# Patient Record
Sex: FEMALE | Race: AMERICAN INDIAN OR ALASKA NATIVE
[De-identification: names, ages, dates, MRNs, and addresses within clinical notes are randomized per-mention and may not be internally consistent; named-entity substitution may affect disease eponyms.]

---

## 2017-04-07 NOTE — MAMMOGRAPHY REPORT
BILATERAL MAMMOGRAM:



FINDINGS:

The breast tissue is heterogeneously dense, which could obscure

detection of small masses (approximately 50%-75% glandular).  No mass, 

distortion, suspicious calcification, or skin change is seen. There are 

no significant changes identified when compared to her prior study in 

January 2015 and March 2016.



CAD was utilized.



IMPRESSION:

Negative mammogram.  There is no mammographic evidence of

malignancy.



RECOMMENDATION:

Follow-up per ACS guidelines.



BI-RADS CATEGORY:  1 = Negative



ACR BI-RADS MAMMOGRAPHIC CODES:

0 = Needs additional imaging evaluation; 1 = Negative; 2 = Benign; 3 = 

Probably benign; 4 = Suspicious; 5 = Malignant; 6 = Known biopsy-proven 

malignancy



COMMENT:

      1.   Dense breast tissue, i.e., adenosis, fibrocystic 

            changes, etc., may obscure an underlying neoplasm.

      2.   Approximately 10% of cancers are not detected with

            mammography.

      3.   A negative mammography report should not delay biopsy 

            if a clinically suspicious mass is present.



COMMENT:

Patient follow-up letters are generated in [employer].

## 2018-06-08 ENCOUNTER — HOSPITAL ENCOUNTER (OUTPATIENT)
Dept: HOSPITAL 5 - MAMMO | Age: 73
Discharge: HOME | End: 2018-06-08
Attending: OBSTETRICS & GYNECOLOGY
Payer: MEDICARE

## 2018-06-08 DIAGNOSIS — Z12.31: Primary | ICD-10-CM

## 2018-06-08 PROCEDURE — 77063 BREAST TOMOSYNTHESIS BI: CPT

## 2018-06-08 PROCEDURE — 77067 SCR MAMMO BI INCL CAD: CPT

## 2018-06-09 NOTE — MAMMOGRAPHY REPORT
BILATERAL DIGITAL SCREENING MAMMOGRAM with CAD and  DIGITAL BREAST 

TOMOSYNTHESIS (DBT) : 06/08/18 



CLINICAL: Routine screening.



COMPARISON:04/07/17, 03/25/16 and 01/26/15



FINDINGS: The breasts are heterogeneously dense, which may obscure 

small masses.Bilateral benign calcifications. No mass, architectural 

distortion or suspicious calcifications.



IMPRESSION: No mammographic evidence of malignancy.



BI-RADS CATEGORY: 2 - - Benign



RECOMMENDATION: Routine mammographic screening in one year.





COMMENT:

Patient follow-up letters are generated by our [employer] application.

## 2018-06-09 NOTE — MAMMOGRAPHY REPORT
BILATERAL DIGITAL SCREENING MAMMOGRAM with CAD and  DIGITAL BREAST 

TOMOSYNTHESIS (DBT) : 06/08/18 



CLINICAL: Routine screening.



COMPARISON:04/07/17, 03/25/16 and 01/26/15



FINDINGS: The breasts are heterogeneously dense, which may obscure 

small masses.Bilateral benign calcifications. No mass, architectural 

distortion or suspicious calcifications.



IMPRESSION: No mammographic evidence of malignancy.



BI-RADS CATEGORY: 2 - - Benign



RECOMMENDATION: Routine mammographic screening in one year.





COMMENT:

Patient follow-up letters are generated by our [employer] application.

## 2020-08-06 ENCOUNTER — OFFICE VISIT (OUTPATIENT)
Dept: URBAN - METROPOLITAN AREA TELEHEALTH 2 | Facility: TELEHEALTH | Age: 75
End: 2020-08-06
Payer: MEDICARE

## 2020-08-06 DIAGNOSIS — K21.9 NONEROSIVE ESOPHAGEAL REFLUX DISEASE: ICD-10-CM

## 2020-08-06 DIAGNOSIS — R10.13 DYSPEPSIA: ICD-10-CM

## 2020-08-06 DIAGNOSIS — Z12.11 SCREEN FOR COLON CANCER: ICD-10-CM

## 2020-08-06 DIAGNOSIS — I10 ESSENTIAL HYPERTENSION: ICD-10-CM

## 2020-08-06 PROCEDURE — 99202 OFFICE O/P NEW SF 15 MIN: CPT | Performed by: INTERNAL MEDICINE

## 2020-08-06 PROCEDURE — 99442 PHONE E/M BY PHYS 11-20 MIN: CPT | Performed by: INTERNAL MEDICINE

## 2020-08-06 PROCEDURE — 99212 OFFICE O/P EST SF 10 MIN: CPT | Performed by: INTERNAL MEDICINE

## 2020-08-06 RX ORDER — OMEPRAZOLE 40 MG/1
TAKE 1 CAPSULE (40 MG) BY ORAL ROUTE ONCE DAILY BEFORE A MEAL FOR 90 DAYS CAPSULE, DELAYED RELEASE PELLETS ORAL 1
Qty: 90 | Refills: 0
Start: 2020-01-20

## 2020-08-06 RX ORDER — TRIAMTERENE/HYDROCHLOROTHIAZID 37.5-25 MG
CAPSULE ORAL
Qty: 0 | Refills: 0 | Status: ACTIVE | COMMUNITY
Start: 1900-01-01

## 2020-08-06 RX ORDER — PHENYLEPHRINE HCL 10 MG
TABLET ORAL
Qty: 0 | Refills: 0 | Status: ACTIVE | COMMUNITY
Start: 1900-01-01

## 2020-08-06 RX ORDER — MAXZIDE 75; 50 MG/1; MG/1
TAKE 1 TABLET BY ORAL ROUTE ONCE DAILY TABLET ORAL 1
Qty: 0 | Refills: 0 | Status: ACTIVE | COMMUNITY
Start: 1900-01-01

## 2020-08-06 NOTE — HPI-OTHER HISTORIES
73 yo lady pt of DR Quan. She has a h/o GERD and last EGD in 2010. She c/o epigastric discomfort for the past 3 weeks. It is intermittent and she takes ranitidine for this. It occurs right after a BM and sometimes when she gets hungry. it resolves about 20 mins after eating. She also has a pre sternal burning sensation with swallowing "sometimes. Its not really burning. I can feel it. It doesnt hurt". No nausea or vomiting or dysphagia. She takes Aleve rarely less than once a week. She had abx last month amoxicillin.  8/20/19 Discussed EGD results. Taking Omeprazole daily - this keeps her symptoms at bay. Symptoms are usually precipitated by pasta, and spicy foods. She eats dinner late, up to 10 pm. she has a very active social life at night - eats a lot of wings and dip. She will then have a shot of tequila and beer at midnight.  2/6/20 Doing well. only takes PPI when she has reflux episodes for a few days and then does not need it for a few weeks. Reflux is usually precipitated by eating or drinking late at night - she is aware and tries to avoid this but has a very active social life. 8/6/20 She is out of Omeprazole and need a refill.  It controls her reflux well when she takes it but she is having take qod now instead of once in a while as  "I have been eating many spicy foods", and is taking omeprazole qod for this reason.   She says she is keeping an eye on her weight and is trying to loose weight. She has been successful in loosing 4 lbs since 10/2019.

## 2021-03-25 ENCOUNTER — WEB ENCOUNTER (OUTPATIENT)
Dept: URBAN - METROPOLITAN AREA CLINIC 17 | Facility: CLINIC | Age: 76
End: 2021-03-25

## 2021-03-25 ENCOUNTER — OFFICE VISIT (OUTPATIENT)
Dept: URBAN - METROPOLITAN AREA CLINIC 17 | Facility: CLINIC | Age: 76
End: 2021-03-25
Payer: MEDICARE

## 2021-03-25 DIAGNOSIS — K21.9 NONEROSIVE ESOPHAGEAL REFLUX DISEASE: ICD-10-CM

## 2021-03-25 DIAGNOSIS — R10.13 DYSPEPSIA: ICD-10-CM

## 2021-03-25 DIAGNOSIS — Z12.11 SCREEN FOR COLON CANCER: ICD-10-CM

## 2021-03-25 DIAGNOSIS — I10 ESSENTIAL HYPERTENSION: ICD-10-CM

## 2021-03-25 PROCEDURE — 99213 OFFICE O/P EST LOW 20 MIN: CPT | Performed by: INTERNAL MEDICINE

## 2021-03-25 RX ORDER — PHENYLEPHRINE HCL 10 MG
TABLET ORAL
Qty: 0 | Refills: 0 | Status: ACTIVE | COMMUNITY
Start: 1900-01-01

## 2021-03-25 RX ORDER — OMEPRAZOLE 40 MG/1
TAKE 1 CAPSULE (40 MG) BY ORAL ROUTE ONCE DAILY BEFORE A MEAL FOR 90 DAYS CAPSULE, DELAYED RELEASE PELLETS ORAL ONCE A DAY
Qty: 90 | Refills: 3

## 2021-03-25 RX ORDER — MAXZIDE 75; 50 MG/1; MG/1
TAKE 1 TABLET BY ORAL ROUTE ONCE DAILY TABLET ORAL 1
Qty: 0 | Refills: 0 | Status: ACTIVE | COMMUNITY
Start: 1900-01-01

## 2021-03-25 RX ORDER — TRIAMTERENE/HYDROCHLOROTHIAZID 37.5-25 MG
CAPSULE ORAL
Qty: 0 | Refills: 0 | Status: ACTIVE | COMMUNITY
Start: 1900-01-01

## 2021-03-25 RX ORDER — OMEPRAZOLE 40 MG/1
TAKE 1 CAPSULE (40 MG) BY ORAL ROUTE ONCE DAILY BEFORE A MEAL FOR 90 DAYS CAPSULE, DELAYED RELEASE PELLETS ORAL 1
Qty: 90 | Refills: 0 | Status: ACTIVE | COMMUNITY
Start: 2020-01-20

## 2021-03-25 NOTE — HPI-OTHER HISTORIES
73 yo lady pt of DR Quan. She has a h/o GERD and last EGD in 2010. She c/o epigastric discomfort for the past 3 weeks. It is intermittent and she takes ranitidine for this. It occurs right after a BM and sometimes when she gets hungry. it resolves about 20 mins after eating. She also has a pre sternal burning sensation with swallowing "sometimes. Its not really burning. I can feel it. It doesnt hurt". No nausea or vomiting or dysphagia. She takes Aleve rarely less than once a week. She had abx last month amoxicillin.  8/20/19 Discussed EGD results. Taking Omeprazole daily - this keeps her symptoms at bay. Symptoms are usually precipitated by pasta, and spicy foods. She eats dinner late, up to 10 pm. she has a very active social life at night - eats a lot of wings and dip. She will then have a shot of tequila and beer at midnight.  2/6/20 Doing well. only takes PPI when she has reflux episodes for a few days and then does not need it for a few weeks. Reflux is usually precipitated by eating or drinking late at night - she is aware and tries to avoid this but has a very active social life. 8/6/20 She is out of Omeprazole and need a refill.  It controls her reflux well when she takes it but she is having take qod now instead of once in a while as  "I have been eating many spicy foods", and is taking omeprazole qod for this reason.   She says she is keeping an eye on her weight and is trying to loose weight. She has been successful in loosing 4 lbs since 10/2019.   3/25/21 doing well. Reflux "its been active the last couple of days but thats on me, so when it bothers me I take my pills. Im not doing what I'm supposed to do". Says eating food that are not spicy and processed is "boring". She needs Omeprazole.

## 2022-07-26 ENCOUNTER — OFFICE VISIT (OUTPATIENT)
Dept: URBAN - METROPOLITAN AREA CLINIC 17 | Facility: CLINIC | Age: 77
End: 2022-07-26
Payer: MEDICARE

## 2022-07-26 ENCOUNTER — LAB OUTSIDE AN ENCOUNTER (OUTPATIENT)
Dept: URBAN - METROPOLITAN AREA CLINIC 17 | Facility: CLINIC | Age: 77
End: 2022-07-26

## 2022-07-26 VITALS
WEIGHT: 180 LBS | SYSTOLIC BLOOD PRESSURE: 126 MMHG | HEIGHT: 66 IN | DIASTOLIC BLOOD PRESSURE: 74 MMHG | HEART RATE: 75 BPM | BODY MASS INDEX: 28.93 KG/M2 | TEMPERATURE: 97.3 F

## 2022-07-26 DIAGNOSIS — K21.9 NONEROSIVE ESOPHAGEAL REFLUX DISEASE: ICD-10-CM

## 2022-07-26 DIAGNOSIS — I10 ESSENTIAL HYPERTENSION: ICD-10-CM

## 2022-07-26 DIAGNOSIS — R13.19 ESOPHAGEAL DYSPHAGIA: ICD-10-CM

## 2022-07-26 DIAGNOSIS — Z12.11 SCREEN FOR COLON CANCER: ICD-10-CM

## 2022-07-26 DIAGNOSIS — R10.13 DYSPEPSIA: ICD-10-CM

## 2022-07-26 PROCEDURE — 99214 OFFICE O/P EST MOD 30 MIN: CPT | Performed by: INTERNAL MEDICINE

## 2022-07-26 RX ORDER — PHENYLEPHRINE HCL 10 MG
TABLET ORAL
Qty: 0 | Refills: 0 | Status: ACTIVE | COMMUNITY
Start: 1900-01-01

## 2022-07-26 RX ORDER — OMEPRAZOLE 40 MG/1
TAKE 1 CAPSULE (40 MG) BY ORAL ROUTE ONCE DAILY BEFORE A MEAL FOR 90 DAYS CAPSULE, DELAYED RELEASE PELLETS ORAL ONCE A DAY
Qty: 90 | Refills: 3

## 2022-07-26 RX ORDER — TRIAMTERENE/HYDROCHLOROTHIAZID 37.5-25 MG
CAPSULE ORAL
Qty: 0 | Refills: 0 | Status: ACTIVE | COMMUNITY
Start: 1900-01-01

## 2022-07-26 RX ORDER — OMEPRAZOLE 40 MG/1
TAKE 1 CAPSULE (40 MG) BY ORAL ROUTE ONCE DAILY BEFORE A MEAL FOR 90 DAYS CAPSULE, DELAYED RELEASE PELLETS ORAL ONCE A DAY
Qty: 90 | Refills: 3 | Status: ACTIVE | COMMUNITY

## 2022-07-26 RX ORDER — MAXZIDE 75; 50 MG/1; MG/1
TAKE 1 TABLET BY ORAL ROUTE ONCE DAILY TABLET ORAL 1
Qty: 0 | Refills: 0 | Status: ACTIVE | COMMUNITY
Start: 1900-01-01

## 2022-07-26 NOTE — HPI-OTHER HISTORIES
73 yo lady pt of DR Quan. She has a h/o GERD and last EGD in 2010. She c/o epigastric discomfort for the past 3 weeks. It is intermittent and she takes ranitidine for this. It occurs right after a BM and sometimes when she gets hungry. it resolves about 20 mins after eating. She also has a pre sternal burning sensation with swallowing "sometimes. Its not really burning. I can feel it. It doesnt hurt". No nausea or vomiting or dysphagia. She takes Aleve rarely less than once a week. She had abx last month amoxicillin.  8/20/19 Discussed EGD results. Taking Omeprazole daily - this keeps her symptoms at bay. Symptoms are usually precipitated by pasta, and spicy foods. She eats dinner late, up to 10 pm. she has a very active social life at night - eats a lot of wings and dip. She will then have a shot of tequila and beer at midnight.  2/6/20 Doing well. only takes PPI when she has reflux episodes for a few days and then does not need it for a few weeks. Reflux is usually precipitated by eating or drinking late at night - she is aware and tries to avoid this but has a very active social life. 8/6/20 She is out of Omeprazole and need a refill.  It controls her reflux well when she takes it but she is having take qod now instead of once in a while as  "I have been eating many spicy foods", and is taking omeprazole qod for this reason.   She says she is keeping an eye on her weight and is trying to loose weight. She has been successful in loosing 4 lbs since 10/2019.   3/25/21 doing well. Reflux "its been active the last couple of days but thats on me, so when it bothers me I take my pills. Im not doing what I'm supposed to do". Says eating food that are not spicy and processed is "boring". She needs Omeprazole.  7/26/22 Here for fu visit No fhx of colon CA. Reviewed colon report from 2017 - good prep no polyps 2006 no polyps Says when she turned 52 she had 2 small polyps No changes in bowel habits. Has regular BMs.  says when she swallows sometimes "even water, it feels like it gets caught in my esophagus. Rice will do that sometimes and then I will regurgitate it. Everytime I eat bread I will hiccup, does not happen with toast". Says symptoms with swallowing happen only when she drinks water + her pills. And the rice is only chinese Rice - these happen once every few weeks. Reviewed EGD done in 2017 - she had no dysphagia then and esophagus was wnl. SAys no longer eating late at night.  Omeprazole controls her symptoms - "I take it when my stomach acts up and then take it 7 days in a row". Does not take it with any regularity. Does this when she gets epigastric pain. This controls her symptoms for a month or so before she has to take it again.  STill drinking beer and tequila at night with her  "but not every night".

## 2022-10-03 ENCOUNTER — WEB ENCOUNTER (OUTPATIENT)
Dept: URBAN - METROPOLITAN AREA CLINIC 17 | Facility: CLINIC | Age: 77
End: 2022-10-03

## 2022-10-03 ENCOUNTER — OFFICE VISIT (OUTPATIENT)
Dept: URBAN - METROPOLITAN AREA CLINIC 17 | Facility: CLINIC | Age: 77
End: 2022-10-03
Payer: MEDICARE

## 2022-10-03 VITALS
WEIGHT: 186 LBS | HEIGHT: 66 IN | DIASTOLIC BLOOD PRESSURE: 78 MMHG | BODY MASS INDEX: 29.89 KG/M2 | TEMPERATURE: 97.1 F | HEART RATE: 71 BPM | SYSTOLIC BLOOD PRESSURE: 137 MMHG

## 2022-10-03 DIAGNOSIS — I10 ESSENTIAL HYPERTENSION: ICD-10-CM

## 2022-10-03 DIAGNOSIS — Z12.11 SCREEN FOR COLON CANCER: ICD-10-CM

## 2022-10-03 DIAGNOSIS — R10.13 DYSPEPSIA: ICD-10-CM

## 2022-10-03 DIAGNOSIS — R13.19 ESOPHAGEAL DYSPHAGIA: ICD-10-CM

## 2022-10-03 DIAGNOSIS — K21.9 NONEROSIVE ESOPHAGEAL REFLUX DISEASE: ICD-10-CM

## 2022-10-03 PROBLEM — 235595009: Status: ACTIVE | Noted: 2020-08-05

## 2022-10-03 PROBLEM — 162031009: Status: ACTIVE | Noted: 2020-08-05

## 2022-10-03 PROBLEM — 59621000: Status: ACTIVE | Noted: 2020-08-05

## 2022-10-03 PROCEDURE — 99213 OFFICE O/P EST LOW 20 MIN: CPT | Performed by: INTERNAL MEDICINE

## 2022-10-03 RX ORDER — TRIAMTERENE/HYDROCHLOROTHIAZID 37.5-25 MG
CAPSULE ORAL
Qty: 0 | Refills: 0 | Status: DISCONTINUED | COMMUNITY
Start: 1900-01-01

## 2022-10-03 RX ORDER — MAXZIDE 75; 50 MG/1; MG/1
TAKE 1 TABLET BY ORAL ROUTE ONCE DAILY TABLET ORAL 1
Qty: 0 | Refills: 0 | Status: ACTIVE | COMMUNITY
Start: 1900-01-01

## 2022-10-03 RX ORDER — OMEPRAZOLE 40 MG/1
TAKE 1 CAPSULE (40 MG) BY ORAL ROUTE ONCE DAILY BEFORE A MEAL FOR 90 DAYS CAPSULE, DELAYED RELEASE PELLETS ORAL ONCE A DAY
Qty: 90 | Refills: 3 | Status: ACTIVE | COMMUNITY

## 2022-10-03 RX ORDER — PHENYLEPHRINE HCL 10 MG
TABLET ORAL
Qty: 0 | Refills: 0 | Status: ACTIVE | COMMUNITY
Start: 1900-01-01

## 2022-10-03 RX ORDER — OMEPRAZOLE 40 MG/1
TAKE 1 CAPSULE (40 MG) BY ORAL ROUTE ONCE DAILY BEFORE A MEAL FOR 90 DAYS CAPSULE, DELAYED RELEASE PELLETS ORAL ONCE A DAY
Qty: 90 | Refills: 3

## 2022-10-03 NOTE — HPI-OTHER HISTORIES
75 yo lady pt of DR Quan. She has a h/o GERD and last EGD in 2010. She c/o epigastric discomfort for the past 3 weeks. It is intermittent and she takes ranitidine for this. It occurs right after a BM and sometimes when she gets hungry. it resolves about 20 mins after eating. She also has a pre sternal burning sensation with swallowing "sometimes. Its not really burning. I can feel it. It doesnt hurt". No nausea or vomiting or dysphagia. She takes Aleve rarely less than once a week. She had abx last month amoxicillin.  8/20/19 Discussed EGD results. Taking Omeprazole daily - this keeps her symptoms at bay. Symptoms are usually precipitated by pasta, and spicy foods. She eats dinner late, up to 10 pm. she has a very active social life at night - eats a lot of wings and dip. She will then have a shot of tequila and beer at midnight.  2/6/20 Doing well. only takes PPI when she has reflux episodes for a few days and then does not need it for a few weeks. Reflux is usually precipitated by eating or drinking late at night - she is aware and tries to avoid this but has a very active social life. 8/6/20 She is out of Omeprazole and need a refill.  It controls her reflux well when she takes it but she is having take qod now instead of once in a while as  "I have been eating many spicy foods", and is taking omeprazole qod for this reason.   She says she is keeping an eye on her weight and is trying to loose weight. She has been successful in loosing 4 lbs since 10/2019.   3/25/21 doing well. Reflux "its been active the last couple of days but thats on me, so when it bothers me I take my pills. Im not doing what I'm supposed to do". Says eating food that are not spicy and processed is "boring". She needs Omeprazole.  7/26/22 Here for fu visit No fhx of colon CA. Reviewed colon report from 2017 - good prep no polyps 2006 no polyps Says when she turned 52 she had 2 small polyps No changes in bowel habits. Has regular BMs.  says when she swallows sometimes "even water, it feels like it gets caught in my esophagus. Rice will do that sometimes and then I will regurgitate it. Everytime I eat bread I will hiccup, does not happen with toast". Says symptoms with swallowing happen only when she drinks water + her pills. And the rice is only chinese Rice - these happen once every few weeks. Reviewed EGD done in 2017 - she had no dysphagia then and esophagus was wnl. SAys no longer eating late at night.  Omeprazole controls her symptoms - "I take it when my stomach acts up and then take it 7 days in a row". Does not take it with any regularity. Does this when she gets epigastric pain. This controls her symptoms for a month or so before she has to take it again.  STill drinking beer and tequila at night with her  "but not every night".  10/3/22 Here for f.u visit Neg Barium swallow.  sAys no more difficulty swallowing.  She has taken her PPI "if she has some epigastric discomfort) "maybe I eat the wrong thing and it goes away.  Gained a few lbs from taking steroids for her sarcoidosis - pleurisy and some l/nodes.

## 2023-08-03 ENCOUNTER — OFFICE VISIT (OUTPATIENT)
Dept: URBAN - METROPOLITAN AREA CLINIC 17 | Facility: CLINIC | Age: 78
End: 2023-08-03
Payer: MEDICARE

## 2023-08-03 VITALS
HEART RATE: 81 BPM | TEMPERATURE: 97.4 F | HEIGHT: 66 IN | WEIGHT: 187 LBS | BODY MASS INDEX: 30.05 KG/M2 | SYSTOLIC BLOOD PRESSURE: 111 MMHG | DIASTOLIC BLOOD PRESSURE: 74 MMHG

## 2023-08-03 DIAGNOSIS — R13.19 ESOPHAGEAL DYSPHAGIA: ICD-10-CM

## 2023-08-03 DIAGNOSIS — K62.5 RECTAL BLEEDING: ICD-10-CM

## 2023-08-03 DIAGNOSIS — R10.13 DYSPEPSIA: ICD-10-CM

## 2023-08-03 DIAGNOSIS — K21.9 NONEROSIVE ESOPHAGEAL REFLUX DISEASE: ICD-10-CM

## 2023-08-03 DIAGNOSIS — I10 ESSENTIAL HYPERTENSION: ICD-10-CM

## 2023-08-03 DIAGNOSIS — Z12.11 SCREEN FOR COLON CANCER: ICD-10-CM

## 2023-08-03 PROCEDURE — 99213 OFFICE O/P EST LOW 20 MIN: CPT | Performed by: INTERNAL MEDICINE

## 2023-08-03 RX ORDER — HYDROCORTISONE ACETATE 25 MG/1
1 SUPPOSITORY SUPPOSITORY RECTAL
Qty: 28 | Refills: 0 | OUTPATIENT
Start: 2023-08-03 | End: 2023-08-17

## 2023-08-03 RX ORDER — PHENYLEPHRINE HCL 10 MG
TABLET ORAL
Qty: 0 | Refills: 0 | Status: ACTIVE | COMMUNITY
Start: 1900-01-01

## 2023-08-03 RX ORDER — OMEPRAZOLE 40 MG/1
TAKE 1 CAPSULE (40 MG) BY ORAL ROUTE ONCE DAILY BEFORE A MEAL FOR 90 DAYS CAPSULE, DELAYED RELEASE PELLETS ORAL ONCE A DAY
Qty: 90 | Refills: 3 | Status: ACTIVE | COMMUNITY

## 2023-08-03 RX ORDER — MAXZIDE 75; 50 MG/1; MG/1
TAKE 1 TABLET BY ORAL ROUTE ONCE DAILY TABLET ORAL 1
Qty: 0 | Refills: 0 | Status: ACTIVE | COMMUNITY
Start: 1900-01-01

## 2023-08-03 NOTE — HPI-OTHER HISTORIES
73 yo lady pt of DR Quan. She has a h/o GERD and last EGD in 2010. She c/o epigastric discomfort for the past 3 weeks. It is intermittent and she takes ranitidine for this. It occurs right after a BM and sometimes when she gets hungry. it resolves about 20 mins after eating. She also has a pre sternal burning sensation with swallowing "sometimes. Its not really burning. I can feel it. It doesnt hurt". No nausea or vomiting or dysphagia. She takes Aleve rarely less than once a week. She had abx last month amoxicillin.  8/20/19 Discussed EGD results. Taking Omeprazole daily - this keeps her symptoms at bay. Symptoms are usually precipitated by pasta, and spicy foods. She eats dinner late, up to 10 pm. she has a very active social life at night - eats a lot of wings and dip. She will then have a shot of tequila and beer at midnight.  2/6/20 Doing well. only takes PPI when she has reflux episodes for a few days and then does not need it for a few weeks. Reflux is usually precipitated by eating or drinking late at night - she is aware and tries to avoid this but has a very active social life. 8/6/20 She is out of Omeprazole and need a refill.  It controls her reflux well when she takes it but she is having take qod now instead of once in a while as  "I have been eating many spicy foods", and is taking omeprazole qod for this reason.   She says she is keeping an eye on her weight and is trying to loose weight. She has been successful in loosing 4 lbs since 10/2019.   3/25/21 doing well. Reflux "its been active the last couple of days but thats on me, so when it bothers me I take my pills. Im not doing what I'm supposed to do". Says eating food that are not spicy and processed is "boring". She needs Omeprazole.  7/26/22 Here for fu visit No fhx of colon CA. Reviewed colon report from 2017 - good prep no polyps 2006 no polyps Says when she turned 52 she had 2 small polyps No changes in bowel habits. Has regular BMs.  says when she swallows sometimes "even water, it feels like it gets caught in my esophagus. Rice will do that sometimes and then I will regurgitate it. Everytime I eat bread I will hiccup, does not happen with toast". Says symptoms with swallowing happen only when she drinks water + her pills. And the rice is only chinese Rice - these happen once every few weeks. Reviewed EGD done in 2017 - she had no dysphagia then and esophagus was wnl. SAys no longer eating late at night.  Omeprazole controls her symptoms - "I take it when my stomach acts up and then take it 7 days in a row". Does not take it with any regularity. Does this when she gets epigastric pain. This controls her symptoms for a month or so before she has to take it again.  STill drinking beer and tequila at night with her  "but not every night".  10/3/22 Here for f.u visit Neg Barium swallow.  sAys no more difficulty swallowing.  She has taken her PPI "if she has some epigastric discomfort) "maybe I eat the wrong thing and it goes away.  Gained a few lbs from taking steroids for her sarcoidosis - pleurisy and some l/nodes.  8/3/23 Here for f.u visit doing well . no dysphagia. Only taking PPI as needed c/o hemorroidal disease. Will occ see blood on tissue wipe never in stool. no rectal pain/ BMs are daily and every once in a while will strain and thats when hemorrhoid flares. drinks only 24 ounces of water a day

## 2023-08-10 ENCOUNTER — TELEPHONE ENCOUNTER (OUTPATIENT)
Dept: URBAN - METROPOLITAN AREA CLINIC 3 | Facility: CLINIC | Age: 78
End: 2023-08-10

## 2023-09-25 ENCOUNTER — OFFICE VISIT (OUTPATIENT)
Dept: URBAN - METROPOLITAN AREA CLINIC 17 | Facility: CLINIC | Age: 78
End: 2023-09-25

## 2023-09-25 RX ORDER — PHENYLEPHRINE HCL 10 MG
TABLET ORAL
Qty: 0 | Refills: 0 | COMMUNITY
Start: 1900-01-01

## 2023-09-25 RX ORDER — MAXZIDE 75; 50 MG/1; MG/1
TAKE 1 TABLET BY ORAL ROUTE ONCE DAILY TABLET ORAL 1
Qty: 0 | Refills: 0 | COMMUNITY
Start: 1900-01-01

## 2023-09-25 RX ORDER — OMEPRAZOLE 40 MG/1
TAKE 1 CAPSULE (40 MG) BY ORAL ROUTE ONCE DAILY BEFORE A MEAL FOR 90 DAYS CAPSULE, DELAYED RELEASE PELLETS ORAL ONCE A DAY
Qty: 90 | Refills: 3 | COMMUNITY

## 2023-11-27 ENCOUNTER — OFFICE VISIT (OUTPATIENT)
Dept: URBAN - METROPOLITAN AREA CLINIC 17 | Facility: CLINIC | Age: 78
End: 2023-11-27

## 2023-11-27 RX ORDER — PHENYLEPHRINE HCL 10 MG
TABLET ORAL
Qty: 0 | Refills: 0 | COMMUNITY
Start: 1900-01-01

## 2023-11-27 RX ORDER — MAXZIDE 75; 50 MG/1; MG/1
TAKE 1 TABLET BY ORAL ROUTE ONCE DAILY TABLET ORAL 1
Qty: 0 | Refills: 0 | COMMUNITY
Start: 1900-01-01

## 2023-11-27 RX ORDER — OMEPRAZOLE 40 MG/1
TAKE 1 CAPSULE (40 MG) BY ORAL ROUTE ONCE DAILY BEFORE A MEAL FOR 90 DAYS CAPSULE, DELAYED RELEASE PELLETS ORAL ONCE A DAY
Qty: 90 | Refills: 3 | COMMUNITY

## 2024-01-22 ENCOUNTER — LAB OUTSIDE AN ENCOUNTER (OUTPATIENT)
Dept: URBAN - METROPOLITAN AREA CLINIC 17 | Facility: CLINIC | Age: 79
End: 2024-01-22

## 2024-01-22 ENCOUNTER — OFFICE VISIT (OUTPATIENT)
Dept: URBAN - METROPOLITAN AREA CLINIC 17 | Facility: CLINIC | Age: 79
End: 2024-01-22
Payer: MEDICARE

## 2024-01-22 VITALS
SYSTOLIC BLOOD PRESSURE: 142 MMHG | TEMPERATURE: 97.1 F | HEART RATE: 70 BPM | DIASTOLIC BLOOD PRESSURE: 84 MMHG | WEIGHT: 184.2 LBS | BODY MASS INDEX: 29.6 KG/M2 | HEIGHT: 66 IN

## 2024-01-22 DIAGNOSIS — K62.5 RECTAL BLEEDING: ICD-10-CM

## 2024-01-22 DIAGNOSIS — R10.13 DYSPEPSIA: ICD-10-CM

## 2024-01-22 DIAGNOSIS — Z12.11 SCREEN FOR COLON CANCER: ICD-10-CM

## 2024-01-22 DIAGNOSIS — I10 ESSENTIAL HYPERTENSION: ICD-10-CM

## 2024-01-22 DIAGNOSIS — R13.19 ESOPHAGEAL DYSPHAGIA: ICD-10-CM

## 2024-01-22 DIAGNOSIS — K21.9 NONEROSIVE ESOPHAGEAL REFLUX DISEASE: ICD-10-CM

## 2024-01-22 PROCEDURE — 99213 OFFICE O/P EST LOW 20 MIN: CPT | Performed by: INTERNAL MEDICINE

## 2024-01-22 RX ORDER — POLYETHYLENE GLYCOL-3350 AND ELECTROLYTES WITH FLAVOR PACK 240; 5.84; 2.98; 6.72; 22.72 G/278.26G; G/278.26G; G/278.26G; G/278.26G; G/278.26G
AS DIRECTED POWDER, FOR SOLUTION ORAL 1
Qty: 1 | Refills: 0 | OUTPATIENT
Start: 2024-01-22 | End: 2024-01-23

## 2024-01-22 RX ORDER — MAXZIDE 75; 50 MG/1; MG/1
TAKE 1 TABLET BY ORAL ROUTE ONCE DAILY TABLET ORAL 1
Qty: 0 | Refills: 0 | Status: ACTIVE | COMMUNITY
Start: 1900-01-01

## 2024-01-22 RX ORDER — PHENYLEPHRINE HCL 10 MG
TABLET ORAL
Qty: 0 | Refills: 0 | Status: ACTIVE | COMMUNITY
Start: 1900-01-01

## 2024-01-22 RX ORDER — OMEPRAZOLE 40 MG/1
TAKE 1 CAPSULE (40 MG) BY ORAL ROUTE ONCE DAILY BEFORE A MEAL FOR 90 DAYS CAPSULE, DELAYED RELEASE PELLETS ORAL ONCE A DAY
Qty: 30 | Refills: 0

## 2024-01-22 RX ORDER — OMEPRAZOLE 40 MG/1
TAKE 1 CAPSULE (40 MG) BY ORAL ROUTE ONCE DAILY BEFORE A MEAL FOR 90 DAYS CAPSULE, DELAYED RELEASE PELLETS ORAL ONCE A DAY
Qty: 90 | Refills: 3 | Status: ACTIVE | COMMUNITY

## 2024-01-22 NOTE — HPI-OTHER HISTORIES
73 yo lady pt of DR Quan. She has a h/o GERD and last EGD in 2010. She c/o epigastric discomfort for the past 3 weeks. It is intermittent and she takes ranitidine for this. It occurs right after a BM and sometimes when she gets hungry. it resolves about 20 mins after eating. She also has a pre sternal burning sensation with swallowing "sometimes. Its not really burning. I can feel it. It doesnt hurt". No nausea or vomiting or dysphagia. She takes Aleve rarely less than once a week. She had abx last month amoxicillin.  8/20/19 Discussed EGD results. Taking Omeprazole daily - this keeps her symptoms at bay. Symptoms are usually precipitated by pasta, and spicy foods. She eats dinner late, up to 10 pm. she has a very active social life at night - eats a lot of wings and dip. She will then have a shot of tequila and beer at midnight.  2/6/20 Doing well. only takes PPI when she has reflux episodes for a few days and then does not need it for a few weeks. Reflux is usually precipitated by eating or drinking late at night - she is aware and tries to avoid this but has a very active social life. 8/6/20 She is out of Omeprazole and need a refill.  It controls her reflux well when she takes it but she is having take qod now instead of once in a while as  "I have been eating many spicy foods", and is taking omeprazole qod for this reason.   She says she is keeping an eye on her weight and is trying to loose weight. She has been successful in loosing 4 lbs since 10/2019.   3/25/21 doing well. Reflux "its been active the last couple of days but thats on me, so when it bothers me I take my pills. Im not doing what I'm supposed to do". Says eating food that are not spicy and processed is "boring". She needs Omeprazole.  7/26/22 Here for fu visit No fhx of colon CA. Reviewed colon report from 2017 - good prep no polyps 2006 no polyps Says when she turned 52 she had 2 small polyps No changes in bowel habits. Has regular BMs.  says when she swallows sometimes "even water, it feels like it gets caught in my esophagus. Rice will do that sometimes and then I will regurgitate it. Everytime I eat bread I will hiccup, does not happen with toast". Says symptoms with swallowing happen only when she drinks water + her pills. And the rice is only chinese Rice - these happen once every few weeks. Reviewed EGD done in 2017 - she had no dysphagia then and esophagus was wnl. SAys no longer eating late at night.  Omeprazole controls her symptoms - "I take it when my stomach acts up and then take it 7 days in a row". Does not take it with any regularity. Does this when she gets epigastric pain. This controls her symptoms for a month or so before she has to take it again.  STill drinking beer and tequila at night with her  "but not every night".  10/3/22 Here for f.u visit Neg Barium swallow.  sAys no more difficulty swallowing.  She has taken her PPI "if she has some epigastric discomfort) "maybe I eat the wrong thing and it goes away.  Gained a few lbs from taking steroids for her sarcoidosis - pleurisy and some l/nodes.  8/3/23 Here for f.u visit doing well . no dysphagia. Only taking PPI as needed c/o hemorroidal disease. Will occ see blood on tissue wipe never in stool. no rectal pain/ BMs are daily and every once in a while will strain and thats when hemorrhoid flares. drinks only 24 ounces of water a day.  1/22/24 Taking prednisone so says this is elevating her BP Used anusol gel which helped - not supp due to cost.  Has been on a lot of meds since 11/2023 for diff things and is now constipated with some straining and rectal bleeding. Wants to schedule colonoscopy  Has a BM qod with a lot of straining. Wants an Omeprazole script " I dont need a lot, every few months I feel something "points to epigastrium" and take it for a day or 2 and it wipes it out for 6-7 months".

## 2024-05-15 ENCOUNTER — OFFICE VISIT (OUTPATIENT)
Dept: URBAN - METROPOLITAN AREA SURGERY CENTER 30 | Facility: SURGERY CENTER | Age: 79
End: 2024-05-15

## 2024-05-16 ENCOUNTER — OFFICE VISIT (OUTPATIENT)
Dept: URBAN - METROPOLITAN AREA CLINIC 17 | Facility: CLINIC | Age: 79
End: 2024-05-16
Payer: MEDICARE

## 2024-05-16 ENCOUNTER — DASHBOARD ENCOUNTERS (OUTPATIENT)
Age: 79
End: 2024-05-16

## 2024-05-16 ENCOUNTER — LAB OUTSIDE AN ENCOUNTER (OUTPATIENT)
Dept: URBAN - METROPOLITAN AREA CLINIC 17 | Facility: CLINIC | Age: 79
End: 2024-05-16

## 2024-05-16 VITALS
DIASTOLIC BLOOD PRESSURE: 82 MMHG | HEART RATE: 79 BPM | BODY MASS INDEX: 30.53 KG/M2 | SYSTOLIC BLOOD PRESSURE: 144 MMHG | HEIGHT: 66 IN | TEMPERATURE: 97.7 F | WEIGHT: 190 LBS

## 2024-05-16 DIAGNOSIS — Z12.11 SCREEN FOR COLON CANCER: ICD-10-CM

## 2024-05-16 DIAGNOSIS — K21.9 NONEROSIVE ESOPHAGEAL REFLUX DISEASE: ICD-10-CM

## 2024-05-16 DIAGNOSIS — R10.13 DYSPEPSIA: ICD-10-CM

## 2024-05-16 DIAGNOSIS — K62.5 RECTAL BLEEDING: ICD-10-CM

## 2024-05-16 DIAGNOSIS — R13.19 ESOPHAGEAL DYSPHAGIA: ICD-10-CM

## 2024-05-16 DIAGNOSIS — I10 ESSENTIAL HYPERTENSION: ICD-10-CM

## 2024-05-16 PROCEDURE — 99214 OFFICE O/P EST MOD 30 MIN: CPT | Performed by: INTERNAL MEDICINE

## 2024-05-16 RX ORDER — MAXZIDE 75; 50 MG/1; MG/1
TAKE 1 TABLET BY ORAL ROUTE ONCE DAILY TABLET ORAL 1
Qty: 0 | Refills: 0 | Status: ACTIVE | COMMUNITY
Start: 1900-01-01

## 2024-05-16 RX ORDER — NAPROXEN 250 MG/1
1 TABLET WITH FOOD OR MILK TABLET ORAL TWICE A DAY
Status: ACTIVE | COMMUNITY

## 2024-05-16 RX ORDER — OMEPRAZOLE 40 MG/1
TAKE 1 CAPSULE (40 MG) BY ORAL ROUTE ONCE DAILY BEFORE A MEAL FOR 90 DAYS CAPSULE, DELAYED RELEASE PELLETS ORAL ONCE A DAY
Qty: 30 | Refills: 0 | Status: ACTIVE | COMMUNITY

## 2024-05-16 RX ORDER — OMEPRAZOLE 40 MG/1
TAKE 1 CAPSULE (40 MG) BY ORAL ROUTE ONCE DAILY BEFORE A MEAL FOR 90 DAYS CAPSULE, DELAYED RELEASE PELLETS ORAL ONCE A DAY
Qty: 90 | Refills: 3

## 2024-05-16 RX ORDER — PHENYLEPHRINE HCL 10 MG
TABLET ORAL
Qty: 0 | Refills: 0 | Status: ON HOLD | COMMUNITY
Start: 1900-01-01

## 2024-06-26 ENCOUNTER — OFFICE VISIT (OUTPATIENT)
Dept: URBAN - METROPOLITAN AREA SURGERY CENTER 16 | Facility: SURGERY CENTER | Age: 79
End: 2024-06-26

## 2024-06-26 ENCOUNTER — OUT OF OFFICE VISIT (OUTPATIENT)
Dept: URBAN - METROPOLITAN AREA SURGERY CENTER 16 | Facility: SURGERY CENTER | Age: 79
End: 2024-06-26
Payer: MEDICARE

## 2024-06-26 ENCOUNTER — TELEPHONE ENCOUNTER (OUTPATIENT)
Dept: URBAN - METROPOLITAN AREA CLINIC 105 | Facility: CLINIC | Age: 79
End: 2024-06-26

## 2024-06-26 DIAGNOSIS — K21.9 GASTRIC REFLUX: ICD-10-CM

## 2024-06-26 DIAGNOSIS — K62.5 RECTAL BLEEDING: ICD-10-CM

## 2024-06-26 DIAGNOSIS — D12.5 BENIGN NEOPLASM OF SIGMOID COLON: ICD-10-CM

## 2024-06-26 DIAGNOSIS — D12.4 BENIGN NEOPLASM OF DESCENDING COLON: ICD-10-CM

## 2024-06-26 PROCEDURE — 00813 ANES UPR LWR GI NDSC PX: CPT | Performed by: ANESTHESIOLOGY

## 2024-06-26 PROCEDURE — 00813 ANES UPR LWR GI NDSC PX: CPT | Performed by: ANESTHESIOLOGIST ASSISTANT

## 2024-06-26 RX ORDER — NAPROXEN 250 MG/1
1 TABLET WITH FOOD OR MILK TABLET ORAL TWICE A DAY
Status: ACTIVE | COMMUNITY

## 2024-06-26 RX ORDER — MAXZIDE 75; 50 MG/1; MG/1
TAKE 1 TABLET BY ORAL ROUTE ONCE DAILY TABLET ORAL 1
Qty: 0 | Refills: 0 | Status: ACTIVE | COMMUNITY
Start: 1900-01-01

## 2024-06-26 RX ORDER — PANTOPRAZOLE SODIUM 40 MG/1
1 TABLET TABLET, DELAYED RELEASE ORAL ONCE A DAY
Qty: 90 TABLET | Refills: 1 | OUTPATIENT
Start: 2024-06-26

## 2024-06-26 RX ORDER — PHENYLEPHRINE HCL 10 MG
TABLET ORAL
Qty: 0 | Refills: 0 | Status: ON HOLD | COMMUNITY
Start: 1900-01-01

## 2024-06-26 RX ORDER — OMEPRAZOLE 40 MG/1
TAKE 1 CAPSULE (40 MG) BY ORAL ROUTE ONCE DAILY BEFORE A MEAL FOR 90 DAYS CAPSULE, DELAYED RELEASE PELLETS ORAL ONCE A DAY
Qty: 90 | Refills: 3 | Status: ACTIVE | COMMUNITY

## 2024-07-25 ENCOUNTER — OFFICE VISIT (OUTPATIENT)
Dept: URBAN - METROPOLITAN AREA CLINIC 17 | Facility: CLINIC | Age: 79
End: 2024-07-25

## 2024-07-25 RX ORDER — OMEPRAZOLE 40 MG/1
TAKE 1 CAPSULE (40 MG) BY ORAL ROUTE ONCE DAILY BEFORE A MEAL FOR 90 DAYS CAPSULE, DELAYED RELEASE PELLETS ORAL ONCE A DAY
Qty: 90 | Refills: 3 | COMMUNITY

## 2024-07-25 RX ORDER — MAXZIDE 75; 50 MG/1; MG/1
TAKE 1 TABLET BY ORAL ROUTE ONCE DAILY TABLET ORAL 1
Qty: 0 | Refills: 0 | COMMUNITY
Start: 1900-01-01

## 2024-07-25 RX ORDER — PANTOPRAZOLE SODIUM 40 MG/1
1 TABLET TABLET, DELAYED RELEASE ORAL ONCE A DAY
Qty: 90 TABLET | Refills: 1 | COMMUNITY
Start: 2024-06-26

## 2024-07-25 RX ORDER — NAPROXEN 250 MG/1
1 TABLET WITH FOOD OR MILK TABLET ORAL TWICE A DAY
COMMUNITY

## 2024-08-01 ENCOUNTER — OFFICE VISIT (OUTPATIENT)
Dept: URBAN - METROPOLITAN AREA CLINIC 17 | Facility: CLINIC | Age: 79
End: 2024-08-01
Payer: MEDICARE

## 2024-08-01 VITALS
WEIGHT: 186 LBS | DIASTOLIC BLOOD PRESSURE: 81 MMHG | HEIGHT: 66 IN | BODY MASS INDEX: 29.89 KG/M2 | HEART RATE: 76 BPM | SYSTOLIC BLOOD PRESSURE: 123 MMHG | TEMPERATURE: 97.7 F

## 2024-08-01 DIAGNOSIS — I10 ESSENTIAL HYPERTENSION: ICD-10-CM

## 2024-08-01 DIAGNOSIS — Z12.11 SCREEN FOR COLON CANCER: ICD-10-CM

## 2024-08-01 DIAGNOSIS — D12.6 SERRATED ADENOMA OF COLON: ICD-10-CM

## 2024-08-01 DIAGNOSIS — K21.9 NONEROSIVE ESOPHAGEAL REFLUX DISEASE: ICD-10-CM

## 2024-08-01 DIAGNOSIS — K27.9 PEPTIC ULCER: ICD-10-CM

## 2024-08-01 DIAGNOSIS — K62.5 RECTAL BLEEDING: ICD-10-CM

## 2024-08-01 DIAGNOSIS — R13.19 ESOPHAGEAL DYSPHAGIA: ICD-10-CM

## 2024-08-01 DIAGNOSIS — R10.13 DYSPEPSIA: ICD-10-CM

## 2024-08-01 PROBLEM — 13200003: Status: ACTIVE | Noted: 2024-08-01

## 2024-08-01 PROCEDURE — 99214 OFFICE O/P EST MOD 30 MIN: CPT | Performed by: INTERNAL MEDICINE

## 2024-08-01 RX ORDER — NAPROXEN 250 MG/1
1 TABLET WITH FOOD OR MILK TABLET ORAL TWICE A DAY
Status: ON HOLD | COMMUNITY

## 2024-08-01 RX ORDER — OMEPRAZOLE 40 MG/1
TAKE 1 CAPSULE (40 MG) BY ORAL ROUTE ONCE DAILY BEFORE A MEAL FOR 90 DAYS CAPSULE, DELAYED RELEASE PELLETS ORAL ONCE A DAY
Qty: 90 | Refills: 3

## 2024-08-01 RX ORDER — MAXZIDE 75; 50 MG/1; MG/1
TAKE 1 TABLET BY ORAL ROUTE ONCE DAILY TABLET ORAL 1
Qty: 0 | Refills: 0 | Status: ACTIVE | COMMUNITY
Start: 1900-01-01

## 2024-08-01 RX ORDER — PANTOPRAZOLE SODIUM 40 MG/1
1 TABLET TABLET, DELAYED RELEASE ORAL ONCE A DAY
Qty: 90 TABLET | Refills: 1 | Status: ACTIVE | COMMUNITY
Start: 2024-06-26

## 2024-08-01 NOTE — HPI-OTHER HISTORIES
75 yo lady pt of DR Quan. She has a h/o GERD and last EGD in 2010. She c/o epigastric discomfort for the past 3 weeks. It is intermittent and she takes ranitidine for this. It occurs right after a BM and sometimes when she gets hungry. it resolves about 20 mins after eating. She also has a pre sternal burning sensation with swallowing "sometimes. Its not really burning. I can feel it. It doesnt hurt". No nausea or vomiting or dysphagia. She takes Aleve rarely less than once a week. She had abx last month amoxicillin.  8/20/19 Discussed EGD results. Taking Omeprazole daily - this keeps her symptoms at bay. Symptoms are usually precipitated by pasta, and spicy foods. She eats dinner late, up to 10 pm. she has a very active social life at night - eats a lot of wings and dip. She will then have a shot of tequila and beer at midnight.  2/6/20 Doing well. only takes PPI when she has reflux episodes for a few days and then does not need it for a few weeks. Reflux is usually precipitated by eating or drinking late at night - she is aware and tries to avoid this but has a very active social life. 8/6/20 She is out of Omeprazole and need a refill.  It controls her reflux well when she takes it but she is having take qod now instead of once in a while as  "I have been eating many spicy foods", and is taking omeprazole qod for this reason.   She says she is keeping an eye on her weight and is trying to loose weight. She has been successful in loosing 4 lbs since 10/2019.   3/25/21 doing well. Reflux "its been active the last couple of days but thats on me, so when it bothers me I take my pills. Im not doing what I'm supposed to do". Says eating food that are not spicy and processed is "boring". She needs Omeprazole.  7/26/22 Here for fu visit No fhx of colon CA. Reviewed colon report from 2017 - good prep no polyps 2006 no polyps Says when she turned 52 she had 2 small polyps No changes in bowel habits. Has regular BMs.  says when she swallows sometimes "even water, it feels like it gets caught in my esophagus. Rice will do that sometimes and then I will regurgitate it. Everytime I eat bread I will hiccup, does not happen with toast". Says symptoms with swallowing happen only when she drinks water + her pills. And the rice is only chinese Rice - these happen once every few weeks. Reviewed EGD done in 2017 - she had no dysphagia then and esophagus was wnl. SAys no longer eating late at night.  Omeprazole controls her symptoms - "I take it when my stomach acts up and then take it 7 days in a row". Does not take it with any regularity. Does this when she gets epigastric pain. This controls her symptoms for a month or so before she has to take it again.  STill drinking beer and tequila at night with her  "but not every night".  10/3/22 Here for dyana visit Neg Barium swallow.  sAys no more difficulty swallowing.  She has taken her PPI "if she has some epigastric discomfort) "maybe I eat the wrong thing and it goes away.  Gained a few lbs from taking steroids for her sarcoidosis - pleurisy and some l/nodes.  8/3/23 Here for dyana visit doing well . no dysphagia. Only taking PPI as needed c/o hemorroidal disease. Will occ see blood on tissue wipe never in stool. no rectal pain/ BMs are daily and every once in a while will strain and thats when hemorrhoid flares. drinks only 24 ounces of water a day.  1/22/24 Taking prednisone so says this is elevating her BP Used anusol gel which helped - not supp due to cost.  Has been on a lot of meds since 11/2023 for diff things and is now constipated with some straining and rectal bleeding. Wants to schedule colonoscopy  Has a BM qod with a lot of straining. Wants an Omeprazole script " I dont need a lot, every few months I feel something "points to epigastrium" and take it for a day or 2 and it wipes it out for 6-7 months".  5/16/24 Here for dyana Says she is taking PPI more due to epigastric discomfort. Eating dinner at 7 pm "I still have my [place] drink".  SAys her appetite is poor - "I only want to eat spaghetti, but I dont really eat".  She is not loosing weight, infact she is gaining weight.  Has not needed miralax - "I am going regular".  8/124.Discussed EGD And colonoscopy results. Taking PPI daily - feels great. except for epigastric pain when she ate fried fish and hot sauce.  Drinking more water.

## 2024-12-23 ENCOUNTER — ERX REFILL RESPONSE (OUTPATIENT)
Dept: URBAN - METROPOLITAN AREA CLINIC 105 | Facility: CLINIC | Age: 79
End: 2024-12-23

## 2024-12-23 RX ORDER — PANTOPRAZOLE SODIUM 40 MG/1
1 TABLET TABLET, DELAYED RELEASE ORAL ONCE A DAY
Qty: 90 TABLET | Refills: 1 | OUTPATIENT

## 2024-12-23 RX ORDER — PANTOPRAZOLE 40 MG/1
TAKE 1 TABLET BY MOUTH DAILY TABLET, DELAYED RELEASE ORAL
Qty: 90 TABLET | Refills: 0 | OUTPATIENT

## 2025-02-06 ENCOUNTER — OFFICE VISIT (OUTPATIENT)
Dept: URBAN - METROPOLITAN AREA CLINIC 17 | Facility: CLINIC | Age: 80
End: 2025-02-06
Payer: MEDICARE

## 2025-02-06 VITALS
TEMPERATURE: 97.3 F | HEART RATE: 73 BPM | BODY MASS INDEX: 29.73 KG/M2 | SYSTOLIC BLOOD PRESSURE: 134 MMHG | WEIGHT: 185 LBS | DIASTOLIC BLOOD PRESSURE: 81 MMHG | HEIGHT: 66 IN

## 2025-02-06 DIAGNOSIS — I10 ESSENTIAL HYPERTENSION: ICD-10-CM

## 2025-02-06 DIAGNOSIS — D12.6 SERRATED ADENOMA OF COLON: ICD-10-CM

## 2025-02-06 DIAGNOSIS — Z12.11 SCREEN FOR COLON CANCER: ICD-10-CM

## 2025-02-06 DIAGNOSIS — K27.9 PEPTIC ULCER: ICD-10-CM

## 2025-02-06 DIAGNOSIS — K21.9 NONEROSIVE ESOPHAGEAL REFLUX DISEASE: ICD-10-CM

## 2025-02-06 DIAGNOSIS — K62.5 RECTAL BLEEDING: ICD-10-CM

## 2025-02-06 DIAGNOSIS — R10.13 DYSPEPSIA: ICD-10-CM

## 2025-02-06 DIAGNOSIS — R13.19 ESOPHAGEAL DYSPHAGIA: ICD-10-CM

## 2025-02-06 PROCEDURE — 99214 OFFICE O/P EST MOD 30 MIN: CPT | Performed by: INTERNAL MEDICINE

## 2025-02-06 RX ORDER — MAXZIDE 75; 50 MG/1; MG/1
TAKE 1 TABLET BY ORAL ROUTE ONCE DAILY TABLET ORAL 1
Qty: 0 | Refills: 0 | Status: ACTIVE | COMMUNITY
Start: 1900-01-01

## 2025-02-06 RX ORDER — PANTOPRAZOLE SODIUM 40 MG/1
1 TABLET TABLET, DELAYED RELEASE ORAL ONCE A DAY
Qty: 90 TABLET | Refills: 3 | OUTPATIENT
Start: 2025-02-06

## 2025-02-06 RX ORDER — PANTOPRAZOLE 40 MG/1
TAKE 1 TABLET BY MOUTH DAILY TABLET, DELAYED RELEASE ORAL
Qty: 90 TABLET | Refills: 0 | Status: ACTIVE | COMMUNITY

## 2025-02-06 NOTE — HPI-OTHER HISTORIES
75 yo lady pt of DR Quan. She has a h/o GERD and last EGD in 2010. She c/o epigastric discomfort for the past 3 weeks. It is intermittent and she takes ranitidine for this. It occurs right after a BM and sometimes when she gets hungry. it resolves about 20 mins after eating. She also has a pre sternal burning sensation with swallowing "sometimes. Its not really burning. I can feel it. It doesnt hurt". No nausea or vomiting or dysphagia. She takes Aleve rarely less than once a week. She had abx last month amoxicillin.  8/20/19 Discussed EGD results. Taking Omeprazole daily - this keeps her symptoms at bay. Symptoms are usually precipitated by pasta, and spicy foods. She eats dinner late, up to 10 pm. she has a very active social life at night - eats a lot of wings and dip. She will then have a shot of tequila and beer at midnight.  2/6/20 Doing well. only takes PPI when she has reflux episodes for a few days and then does not need it for a few weeks. Reflux is usually precipitated by eating or drinking late at night - she is aware and tries to avoid this but has a very active social life. 8/6/20 She is out of Omeprazole and need a refill.  It controls her reflux well when she takes it but she is having take qod now instead of once in a while as  "I have been eating many spicy foods", and is taking omeprazole qod for this reason.   She says she is keeping an eye on her weight and is trying to loose weight. She has been successful in loosing 4 lbs since 10/2019.   3/25/21 doing well. Reflux "its been active the last couple of days but thats on me, so when it bothers me I take my pills. Im not doing what I'm supposed to do". Says eating food that are not spicy and processed is "boring". She needs Omeprazole.  7/26/22 Here for fu visit No fhx of colon CA. Reviewed colon report from 2017 - good prep no polyps 2006 no polyps Says when she turned 52 she had 2 small polyps No changes in bowel habits. Has regular BMs.  says when she swallows sometimes "even water, it feels like it gets caught in my esophagus. Rice will do that sometimes and then I will regurgitate it. Everytime I eat bread I will hiccup, does not happen with toast". Says symptoms with swallowing happen only when she drinks water + her pills. And the rice is only chinese Rice - these happen once every few weeks. Reviewed EGD done in 2017 - she had no dysphagia then and esophagus was wnl. SAys no longer eating late at night.  Omeprazole controls her symptoms - "I take it when my stomach acts up and then take it 7 days in a row". Does not take it with any regularity. Does this when she gets epigastric pain. This controls her symptoms for a month or so before she has to take it again.  STill drinking beer and tequila at night with her  "but not every night".  10/3/22 Here for dyana visit Neg Barium swallow.  sAys no more difficulty swallowing.  She has taken her PPI "if she has some epigastric discomfort) "maybe I eat the wrong thing and it goes away.  Gained a few lbs from taking steroids for her sarcoidosis - pleurisy and some l/nodes.  8/3/23 Here for dyana visit doing well . no dysphagia. Only taking PPI as needed c/o hemorroidal disease. Will occ see blood on tissue wipe never in stool. no rectal pain/ BMs are daily and every once in a while will strain and thats when hemorrhoid flares. drinks only 24 ounces of water a day.  1/22/24 Taking prednisone so says this is elevating her BP Used anusol gel which helped - not supp due to cost.  Has been on a lot of meds since 11/2023 for diff things and is now constipated with some straining and rectal bleeding. Wants to schedule colonoscopy  Has a BM qod with a lot of straining. Wants an Omeprazole script " I dont need a lot, every few months I feel something "points to epigastrium" and take it for a day or 2 and it wipes it out for 6-7 months".  5/16/24 Here for dyana Says she is taking PPI more due to epigastric discomfort. Eating dinner at 7 pm "I still have my MN drink".  SAys her appetite is poor - "I only want to eat spaghetti, but I dont really eat".  She is not loosing weight, infact she is gaining weight.  Has not needed miralax - "I am going regular".  8/124.Discussed EGD And colonoscopy results. Taking PPI daily - feels great. except for epigastric pain when she ate fried fish and hot sauce.  Drinking more water.  2/6/25 Says now only eats ice cream once a month at [facility] and 2 cocktails at night "I am doing much better" Occ will have rare epigastric discomfort. - set off by hunger, better with eating.  says it is not pain

## 2025-03-26 ENCOUNTER — ERX REFILL RESPONSE (OUTPATIENT)
Dept: URBAN - METROPOLITAN AREA CLINIC 105 | Facility: CLINIC | Age: 80
End: 2025-03-26

## 2025-03-26 RX ORDER — PANTOPRAZOLE 40 MG/1
TAKE 1 TABLET BY MOUTH DAILY TABLET, DELAYED RELEASE ORAL
Qty: 90 TABLET | Refills: 0 | OUTPATIENT

## 2025-05-15 ENCOUNTER — OFFICE VISIT (OUTPATIENT)
Dept: URBAN - METROPOLITAN AREA CLINIC 17 | Facility: CLINIC | Age: 80
End: 2025-05-15

## 2025-05-15 RX ORDER — MAXZIDE 75; 50 MG/1; MG/1
TAKE 1 TABLET BY ORAL ROUTE ONCE DAILY TABLET ORAL 1
Qty: 0 | Refills: 0 | COMMUNITY
Start: 1900-01-01

## 2025-05-15 RX ORDER — PANTOPRAZOLE 40 MG/1
TAKE 1 TABLET BY MOUTH DAILY TABLET, DELAYED RELEASE ORAL
Qty: 90 TABLET | Refills: 0 | COMMUNITY

## 2025-06-12 ENCOUNTER — OFFICE VISIT (OUTPATIENT)
Dept: URBAN - METROPOLITAN AREA CLINIC 17 | Facility: CLINIC | Age: 80
End: 2025-06-12
Payer: MEDICARE

## 2025-06-12 DIAGNOSIS — Z86.0101 H/O ADENOMATOUS POLYP OF COLON: ICD-10-CM

## 2025-06-12 DIAGNOSIS — R13.19 ESOPHAGEAL DYSPHAGIA: ICD-10-CM

## 2025-06-12 DIAGNOSIS — K27.9 PEPTIC ULCER: ICD-10-CM

## 2025-06-12 DIAGNOSIS — K62.5 RECTAL BLEEDING: ICD-10-CM

## 2025-06-12 DIAGNOSIS — R10.13 DYSPEPSIA: ICD-10-CM

## 2025-06-12 DIAGNOSIS — K59.09 CHRONIC CONSTIPATION: ICD-10-CM

## 2025-06-12 DIAGNOSIS — I10 ESSENTIAL HYPERTENSION: ICD-10-CM

## 2025-06-12 PROCEDURE — 99214 OFFICE O/P EST MOD 30 MIN: CPT | Performed by: INTERNAL MEDICINE

## 2025-06-12 RX ORDER — PANTOPRAZOLE 40 MG/1
TAKE 1 TABLET BY MOUTH DAILY TABLET, DELAYED RELEASE ORAL
Qty: 90 TABLET | Refills: 0 | Status: ACTIVE | COMMUNITY

## 2025-06-12 RX ORDER — MAXZIDE 75; 50 MG/1; MG/1
TAKE 1 TABLET BY ORAL ROUTE ONCE DAILY TABLET ORAL 1
Qty: 0 | Refills: 0 | Status: ACTIVE | COMMUNITY
Start: 1900-01-01

## 2025-06-12 RX ORDER — OMEPRAZOLE 40 MG/1
1 CAPSULE 1/2 TO 1 HOUR BEFORE MORNING MEAL CAPSULE, DELAYED RELEASE ORAL ONCE A DAY
Qty: 90 | Refills: 3 | OUTPATIENT
Start: 2025-06-12